# Patient Record
Sex: MALE | Race: WHITE | NOT HISPANIC OR LATINO | Employment: OTHER | URBAN - METROPOLITAN AREA
[De-identification: names, ages, dates, MRNs, and addresses within clinical notes are randomized per-mention and may not be internally consistent; named-entity substitution may affect disease eponyms.]

---

## 2017-11-02 NOTE — PATIENT DISCUSSION
JORGITO OU:  PRESCRIBED UV PROTECTION TO SLOW GROWTH. PRESCRIBE ARTIFICAL TEARS TO INCREASE COMFORT.

## 2019-11-04 ENCOUNTER — SURGERY/PROCEDURE (OUTPATIENT)
Dept: URBAN - METROPOLITAN AREA CLINIC 32 | Facility: CLINIC | Age: 59
End: 2019-11-04

## 2019-11-04 DIAGNOSIS — H40.1132: ICD-10-CM

## 2019-11-04 PROCEDURE — 6585550 LASER TRABECULOPLASTY

## 2019-11-18 ENCOUNTER — PREPPED CHART (OUTPATIENT)
Dept: URBAN - METROPOLITAN AREA CLINIC 32 | Facility: CLINIC | Age: 59
End: 2019-11-18

## 2019-11-18 ASSESSMENT — VISUAL ACUITY
OS_SC: 20/20-3
OD_SC: 20/25-2

## 2019-11-18 ASSESSMENT — TONOMETRY
OS_IOP_MMHG: 20
OD_IOP_MMHG: 18

## 2019-11-19 ENCOUNTER — ESTABLISHED PATIENT (OUTPATIENT)
Dept: URBAN - METROPOLITAN AREA CLINIC 32 | Facility: CLINIC | Age: 59
End: 2019-11-19

## 2019-11-19 DIAGNOSIS — H40.1132: ICD-10-CM

## 2019-11-19 PROCEDURE — 99212 OFFICE O/P EST SF 10 MIN: CPT

## 2019-11-19 ASSESSMENT — VISUAL ACUITY
OS_SC: 20/20-2
OD_SC: 20/25-1

## 2019-11-19 ASSESSMENT — TONOMETRY
OS_IOP_MMHG: 16
OD_IOP_MMHG: 24

## 2019-11-19 ASSESSMENT — PACHYMETRY
OS_CT_UM: 519
OD_CT_UM: 524

## 2019-12-17 ENCOUNTER — IOP CHECK (OUTPATIENT)
Dept: URBAN - METROPOLITAN AREA CLINIC 32 | Facility: CLINIC | Age: 59
End: 2019-12-17

## 2019-12-17 DIAGNOSIS — H40.1132: ICD-10-CM

## 2019-12-17 PROCEDURE — 99212 OFFICE O/P EST SF 10 MIN: CPT

## 2019-12-17 ASSESSMENT — VISUAL ACUITY
OD_SC: 20/25-1
OS_SC: 20/20

## 2019-12-17 ASSESSMENT — TONOMETRY
OD_IOP_MMHG: 18
OS_IOP_MMHG: 18
OD_IOP_MMHG: 21
OS_IOP_MMHG: 16

## 2020-10-05 NOTE — PATIENT DISCUSSION
EPIRETINAL MEMBRANE, OD: MAC OCT DONE TODAY TO DOCUMENT. NOT VISUALLY SIGNIFICANT TO THE PATIENT AT THIS TIME. FOLLOW.

## 2021-04-19 ENCOUNTER — ESTABLISHED COMPREHENSIVE EXAM (OUTPATIENT)
Dept: URBAN - METROPOLITAN AREA CLINIC 32 | Facility: CLINIC | Age: 61
End: 2021-04-19

## 2021-04-19 DIAGNOSIS — H40.1132: ICD-10-CM

## 2021-04-19 PROCEDURE — 92133 CPTRZD OPH DX IMG PST SGM ON: CPT

## 2021-04-19 PROCEDURE — 92014 COMPRE OPH EXAM EST PT 1/>: CPT

## 2021-04-19 ASSESSMENT — TONOMETRY
OS_IOP_MMHG: 30
OS_IOP_MMHG: 21
OD_IOP_MMHG: 27
OD_IOP_MMHG: 28

## 2021-04-19 ASSESSMENT — KERATOMETRY
OD_K2POWER_DIOPTERS: 43
OD_AXISANGLE_DEGREES: 65
OD_AXISANGLE2_DEGREES: 155
OS_AXISANGLE_DEGREES: 148
OS_K2POWER_DIOPTERS: 42.75
OS_AXISANGLE2_DEGREES: 58
OD_K1POWER_DIOPTERS: 43.50
OS_K1POWER_DIOPTERS: 43

## 2021-04-19 ASSESSMENT — VISUAL ACUITY
OS_CC: J1+
OS_SC: 20/25+2
OD_CC: J2
OD_SC: 20/20-2

## 2021-05-06 ENCOUNTER — IOP CHECK (OUTPATIENT)
Dept: URBAN - METROPOLITAN AREA CLINIC 32 | Facility: CLINIC | Age: 61
End: 2021-05-06

## 2021-05-06 DIAGNOSIS — H40.1132: ICD-10-CM

## 2021-05-06 PROCEDURE — 99212 OFFICE O/P EST SF 10 MIN: CPT

## 2021-05-06 ASSESSMENT — TONOMETRY
OS_IOP_MMHG: 17
OD_IOP_MMHG: 13

## 2021-05-06 ASSESSMENT — KERATOMETRY
OS_K2POWER_DIOPTERS: 42.75
OD_K2POWER_DIOPTERS: 43
OD_AXISANGLE_DEGREES: 65
OS_K1POWER_DIOPTERS: 43
OD_AXISANGLE2_DEGREES: 155
OS_AXISANGLE2_DEGREES: 58
OD_K1POWER_DIOPTERS: 43.50
OS_AXISANGLE_DEGREES: 148

## 2021-05-06 ASSESSMENT — VISUAL ACUITY
OD_SC: 20/25-1
OS_SC: 20/20

## 2021-10-04 ENCOUNTER — VISUAL FIELD FOLLOW-UP (OUTPATIENT)
Dept: URBAN - METROPOLITAN AREA CLINIC 32 | Facility: CLINIC | Age: 61
End: 2021-10-04

## 2021-10-04 DIAGNOSIS — H40.1132: ICD-10-CM

## 2021-10-04 PROCEDURE — 92083 EXTENDED VISUAL FIELD XM: CPT

## 2021-10-04 PROCEDURE — 99213 OFFICE O/P EST LOW 20 MIN: CPT

## 2021-10-04 ASSESSMENT — KERATOMETRY
OS_AXISANGLE2_DEGREES: 58
OS_K1POWER_DIOPTERS: 43
OS_AXISANGLE_DEGREES: 148
OD_AXISANGLE2_DEGREES: 155
OS_K2POWER_DIOPTERS: 42.75
OD_K2POWER_DIOPTERS: 43
OD_K1POWER_DIOPTERS: 43.50
OD_AXISANGLE_DEGREES: 65

## 2021-10-04 ASSESSMENT — TONOMETRY
OD_IOP_MMHG: 14
OS_IOP_MMHG: 16

## 2021-10-04 ASSESSMENT — VISUAL ACUITY
OS_SC: 20/25
OD_SC: 20/30+2

## 2022-03-04 ENCOUNTER — ESTABLISHED PATIENT (OUTPATIENT)
Dept: URBAN - METROPOLITAN AREA CLINIC 32 | Facility: CLINIC | Age: 62
End: 2022-03-04

## 2022-03-04 DIAGNOSIS — H25.12: ICD-10-CM

## 2022-03-04 DIAGNOSIS — H40.1132: ICD-10-CM

## 2022-03-04 DIAGNOSIS — H04.123: ICD-10-CM

## 2022-03-04 DIAGNOSIS — H04.563: ICD-10-CM

## 2022-03-04 DIAGNOSIS — H04.221: ICD-10-CM

## 2022-03-04 PROCEDURE — 92014 COMPRE OPH EXAM EST PT 1/>: CPT

## 2022-03-04 PROCEDURE — 92133 CPTRZD OPH DX IMG PST SGM ON: CPT

## 2022-03-04 PROCEDURE — 92015 DETERMINE REFRACTIVE STATE: CPT

## 2022-03-04 RX ORDER — TIMOLOL MALEATE 6.8 MG/ML: 1 SOLUTION OPHTHALMIC ONCE A DAY

## 2022-03-04 ASSESSMENT — KERATOMETRY
OS_K2POWER_DIOPTERS: 42.75
OS_AXISANGLE2_DEGREES: 58
OS_K1POWER_DIOPTERS: 43
OD_K2POWER_DIOPTERS: 43
OD_K1POWER_DIOPTERS: 43.50
OS_AXISANGLE_DEGREES: 148
OD_AXISANGLE2_DEGREES: 155
OD_AXISANGLE_DEGREES: 65

## 2022-03-04 ASSESSMENT — VISUAL ACUITY
OD_SC: 20/20-1
OS_SC: 20/20
OS_CC: J1+
OD_CC: J1+
OS_GLARE: 20/30

## 2022-03-04 ASSESSMENT — TONOMETRY
OD_IOP_MMHG: 28
OS_IOP_MMHG: 29
OS_IOP_MMHG: 28

## 2022-03-23 ENCOUNTER — FOLLOW UP (OUTPATIENT)
Dept: URBAN - METROPOLITAN AREA CLINIC 32 | Facility: CLINIC | Age: 62
End: 2022-03-23

## 2022-03-23 DIAGNOSIS — H40.1132: ICD-10-CM

## 2022-03-23 DIAGNOSIS — H04.563: ICD-10-CM

## 2022-03-23 DIAGNOSIS — H04.221: ICD-10-CM

## 2022-03-23 DIAGNOSIS — H04.123: ICD-10-CM

## 2022-03-23 PROCEDURE — 99214 OFFICE O/P EST MOD 30 MIN: CPT

## 2022-03-23 ASSESSMENT — TONOMETRY
OS_IOP_MMHG: 13
OD_IOP_MMHG: 12

## 2022-03-23 ASSESSMENT — KERATOMETRY
OS_K2POWER_DIOPTERS: 42.75
OS_K1POWER_DIOPTERS: 43
OD_AXISANGLE_DEGREES: 65
OD_AXISANGLE2_DEGREES: 155
OD_K1POWER_DIOPTERS: 43.50
OD_K2POWER_DIOPTERS: 43
OS_AXISANGLE2_DEGREES: 58
OS_AXISANGLE_DEGREES: 148

## 2022-03-23 ASSESSMENT — VISUAL ACUITY
OD_SC: 20/20
OS_SC: 20/20

## 2022-06-02 ASSESSMENT — KERATOMETRY
OD_AXISANGLE2_DEGREES: 155
OS_K1POWER_DIOPTERS: 43
OD_K2POWER_DIOPTERS: 43
OS_K2POWER_DIOPTERS: 42.75
OS_AXISANGLE2_DEGREES: 58
OD_AXISANGLE_DEGREES: 65
OD_K1POWER_DIOPTERS: 43.50
OS_AXISANGLE_DEGREES: 148

## 2022-06-09 ENCOUNTER — CLINIC PROCEDURE ONLY (OUTPATIENT)
Dept: URBAN - METROPOLITAN AREA CLINIC 32 | Facility: CLINIC | Age: 62
End: 2022-06-09

## 2022-06-09 DIAGNOSIS — H04.563: ICD-10-CM

## 2022-06-09 DIAGNOSIS — H04.221: ICD-10-CM

## 2022-06-09 PROCEDURE — 68440 SNIP INC LACRIMAL PUNCTUM: CPT

## 2022-06-09 RX ORDER — NEOMYCIN SULFATE, POLYMYXIN B SULFATE AND DEXAMETHASONE 3.5; 10000; 1 MG/ML; [USP'U]/ML; MG/ML: 1 SUSPENSION OPHTHALMIC

## 2022-07-13 NOTE — PATIENT DISCUSSION
STABLE, MAC OCT DONE TODAY TO DOCUMENT. NOT VISUALLY SIGNIFICANT TO THE PATIENT AT THIS TIME. FOLLOW.

## 2022-07-13 NOTE — PATIENT DISCUSSION
Discussed findings with patient. Rx AREDS 2 and monitor at home with [id number]. Patient educated on importance of UV protection, healthy diet and no smoking. Advised patient to RTC immediately if vision worsen and grid appears darker, wavy or blank.